# Patient Record
Sex: FEMALE | HISPANIC OR LATINO | URBAN - METROPOLITAN AREA
[De-identification: names, ages, dates, MRNs, and addresses within clinical notes are randomized per-mention and may not be internally consistent; named-entity substitution may affect disease eponyms.]

---
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
## 2023-01-10 NOTE — ED ADULT TRIAGE NOTE - HAVE YOU HAD A FIRST COVID-19 BOOSTER?
Patient called regarding semaglutide (OZEMPIC, 0.25 OR 0.5 MG/DOSE,) 2 MG/1.5ML SOPN pen.    She would like this changed to the oral medication instead.  Routed to provider to advise.    Kristin RN,BSN  Triage Nurse  [facility]: [facility]  Ph: [phone]       No

## 2023-10-19 NOTE — ED PROVIDER NOTE - NSICDXPASTMEDICALHX_GEN_ALL_CORE_FT
Notified MD of patients dressing saturated with blood. Dressing change to be completed by resident or nurse. Patients nasal swab (10/18/2023)  resulted in MRSA. ID notified. PAST MEDICAL HISTORY:  Major depression